# Patient Record
Sex: MALE | Race: WHITE | NOT HISPANIC OR LATINO | Employment: FULL TIME | URBAN - METROPOLITAN AREA
[De-identification: names, ages, dates, MRNs, and addresses within clinical notes are randomized per-mention and may not be internally consistent; named-entity substitution may affect disease eponyms.]

---

## 2021-01-15 ENCOUNTER — OFFICE VISIT (OUTPATIENT)
Dept: FAMILY MEDICINE CLINIC | Facility: CLINIC | Age: 31
End: 2021-01-15

## 2021-01-15 VITALS
SYSTOLIC BLOOD PRESSURE: 120 MMHG | RESPIRATION RATE: 16 BRPM | DIASTOLIC BLOOD PRESSURE: 70 MMHG | BODY MASS INDEX: 42.66 KG/M2 | HEIGHT: 72 IN | HEART RATE: 86 BPM | WEIGHT: 315 LBS | TEMPERATURE: 97.3 F | OXYGEN SATURATION: 98 %

## 2021-01-15 DIAGNOSIS — Z00.00 WELLNESS EXAMINATION: Primary | ICD-10-CM

## 2021-01-15 PROBLEM — J45.909 CHILDHOOD ASTHMA WITHOUT COMPLICATION: Status: ACTIVE | Noted: 2021-01-15

## 2021-01-15 PROCEDURE — 99385 PREV VISIT NEW AGE 18-39: CPT | Performed by: NURSE PRACTITIONER

## 2021-01-15 NOTE — PROGRESS NOTES
Subjective   Greg Fletcher is a 30 y.o. male.     History of Present Illness   Greg Fletcher 30 y.o. male who presents for a new patient to get established and for a wellness visit.  he has a history of   Patient Active Problem List   Diagnosis   • Childhood asthma without complication   .  he has been feeling well.  Labs results discussed in detail with the patient.  Plan to update vaccines if needed today.  I  reviewed health maintenance with him as part of my preventative care plan.      States he has not needed an inhaler since he was a child.  He will occasionally get bronchitis but otherwise does not have any issues with wheezing.   Health Habits:  Dental Exam. unknown  Eye Exam. up to date  Exercise: 3 times/week.  Current exercise activities include: cardiovascular workout on exercise equipment and weightlifting  Preventative counseling provided and diet/exercise and vaccinations.     The following portions of the patient's history were reviewed and updated as appropriate: allergies, current medications, past family history, past medical history, past social history, past surgical history and problem list.    Review of Systems   Constitutional: Negative for fatigue.   HENT: Negative for congestion.    Eyes: Negative for visual disturbance.   Respiratory: Negative for cough and shortness of breath.    Cardiovascular: Negative for chest pain and palpitations.   Gastrointestinal: Negative for constipation and diarrhea.   Endocrine: Negative for cold intolerance, heat intolerance, polydipsia, polyphagia and polyuria.   Genitourinary: Negative for difficulty urinating.   Musculoskeletal: Negative for back pain.   Skin: Negative for rash.   Allergic/Immunologic: Negative for immunocompromised state.   Neurological: Negative for dizziness and headaches.   Hematological: Negative for adenopathy. Does not bruise/bleed easily.   Psychiatric/Behavioral: Negative for dysphoric mood and sleep  disturbance. The patient is not nervous/anxious.        Objective   Physical Exam  Vitals signs and nursing note reviewed.   Constitutional:       Appearance: Normal appearance. He is well-developed.   Cardiovascular:      Rate and Rhythm: Normal rate and regular rhythm.   Pulmonary:      Effort: Pulmonary effort is normal.      Breath sounds: Normal breath sounds.   Skin:     General: Skin is warm and dry.   Neurological:      Mental Status: He is alert and oriented to person, place, and time.   Psychiatric:         Mood and Affect: Mood normal.         Behavior: Behavior normal.         Thought Content: Thought content normal.         Judgment: Judgment normal.         Assessment/Plan   Diagnoses and all orders for this visit:    1. Wellness examination (Primary)  -     Tdap Vaccine Greater Than or Equal To 6yo IM  -     Comprehensive metabolic panel  -     Lipid panel  -     CBC and Differential  -     TSH

## 2021-01-18 PROCEDURE — 90715 TDAP VACCINE 7 YRS/> IM: CPT | Performed by: NURSE PRACTITIONER

## 2021-01-18 PROCEDURE — 90471 IMMUNIZATION ADMIN: CPT | Performed by: NURSE PRACTITIONER

## 2021-01-20 LAB
ALBUMIN SERPL-MCNC: 4.3 G/DL (ref 3.5–5.2)
ALBUMIN/GLOB SERPL: 1.5 G/DL
ALP SERPL-CCNC: 56 U/L (ref 39–117)
ALT SERPL-CCNC: 44 U/L (ref 1–41)
AST SERPL-CCNC: 20 U/L (ref 1–40)
BASOPHILS # BLD AUTO: 0.05 10*3/MM3 (ref 0–0.2)
BASOPHILS NFR BLD AUTO: 0.6 % (ref 0–1.5)
BILIRUB SERPL-MCNC: 0.5 MG/DL (ref 0–1.2)
BUN SERPL-MCNC: 14 MG/DL (ref 6–20)
BUN/CREAT SERPL: 14.4 (ref 7–25)
CALCIUM SERPL-MCNC: 9.3 MG/DL (ref 8.6–10.5)
CHLORIDE SERPL-SCNC: 99 MMOL/L (ref 98–107)
CHOLEST SERPL-MCNC: 189 MG/DL (ref 0–200)
CO2 SERPL-SCNC: 29.1 MMOL/L (ref 22–29)
CREAT SERPL-MCNC: 0.97 MG/DL (ref 0.76–1.27)
EOSINOPHIL # BLD AUTO: 0.29 10*3/MM3 (ref 0–0.4)
EOSINOPHIL NFR BLD AUTO: 3.2 % (ref 0.3–6.2)
ERYTHROCYTE [DISTWIDTH] IN BLOOD BY AUTOMATED COUNT: 12.8 % (ref 12.3–15.4)
GLOBULIN SER CALC-MCNC: 2.8 GM/DL
GLUCOSE SERPL-MCNC: 120 MG/DL (ref 65–99)
HCT VFR BLD AUTO: 47.5 % (ref 37.5–51)
HDLC SERPL-MCNC: 31 MG/DL (ref 40–60)
HGB BLD-MCNC: 15.7 G/DL (ref 13–17.7)
IMM GRANULOCYTES # BLD AUTO: 0.02 10*3/MM3 (ref 0–0.05)
IMM GRANULOCYTES NFR BLD AUTO: 0.2 % (ref 0–0.5)
LDLC SERPL CALC-MCNC: 130 MG/DL (ref 0–100)
LYMPHOCYTES # BLD AUTO: 2.96 10*3/MM3 (ref 0.7–3.1)
LYMPHOCYTES NFR BLD AUTO: 32.6 % (ref 19.6–45.3)
MCH RBC QN AUTO: 28.1 PG (ref 26.6–33)
MCHC RBC AUTO-ENTMCNC: 33.1 G/DL (ref 31.5–35.7)
MCV RBC AUTO: 85.1 FL (ref 79–97)
MONOCYTES # BLD AUTO: 0.57 10*3/MM3 (ref 0.1–0.9)
MONOCYTES NFR BLD AUTO: 6.3 % (ref 5–12)
NEUTROPHILS # BLD AUTO: 5.19 10*3/MM3 (ref 1.7–7)
NEUTROPHILS NFR BLD AUTO: 57.1 % (ref 42.7–76)
NRBC BLD AUTO-RTO: 0 /100 WBC (ref 0–0.2)
PLATELET # BLD AUTO: 312 10*3/MM3 (ref 140–450)
POTASSIUM SERPL-SCNC: 4.4 MMOL/L (ref 3.5–5.2)
PROT SERPL-MCNC: 7.1 G/DL (ref 6–8.5)
RBC # BLD AUTO: 5.58 10*6/MM3 (ref 4.14–5.8)
SODIUM SERPL-SCNC: 137 MMOL/L (ref 136–145)
TRIGL SERPL-MCNC: 155 MG/DL (ref 0–150)
TSH SERPL DL<=0.005 MIU/L-ACNC: 2.46 UIU/ML (ref 0.27–4.2)
VLDLC SERPL CALC-MCNC: 28 MG/DL (ref 5–40)
WBC # BLD AUTO: 9.08 10*3/MM3 (ref 3.4–10.8)

## 2021-01-22 LAB
HBA1C MFR BLD: 5.4 % (ref 4.8–5.6)
Lab: NORMAL
WRITTEN AUTHORIZATION: NORMAL

## 2021-04-16 ENCOUNTER — BULK ORDERING (OUTPATIENT)
Dept: CASE MANAGEMENT | Facility: OTHER | Age: 31
End: 2021-04-16

## 2021-04-16 DIAGNOSIS — Z23 IMMUNIZATION DUE: ICD-10-CM

## 2021-04-19 ENCOUNTER — IMMUNIZATION (OUTPATIENT)
Dept: VACCINE CLINIC | Facility: HOSPITAL | Age: 31
End: 2021-04-19

## 2021-04-19 PROCEDURE — 0001A: CPT | Performed by: INTERNAL MEDICINE

## 2021-04-19 PROCEDURE — 91300 HC SARSCOV02 VAC 30MCG/0.3ML IM: CPT | Performed by: INTERNAL MEDICINE

## 2021-05-10 ENCOUNTER — IMMUNIZATION (OUTPATIENT)
Dept: VACCINE CLINIC | Facility: HOSPITAL | Age: 31
End: 2021-05-10

## 2021-05-10 PROCEDURE — 91300 HC SARSCOV02 VAC 30MCG/0.3ML IM: CPT | Performed by: INTERNAL MEDICINE

## 2021-05-10 PROCEDURE — 0002A: CPT | Performed by: INTERNAL MEDICINE

## 2025-03-18 ENCOUNTER — APPOINTMENT (OUTPATIENT)
Dept: CT IMAGING | Facility: HOSPITAL | Age: 35
End: 2025-03-18
Payer: COMMERCIAL

## 2025-03-18 ENCOUNTER — HOSPITAL ENCOUNTER (EMERGENCY)
Facility: HOSPITAL | Age: 35
Discharge: HOME | End: 2025-03-18
Attending: EMERGENCY MEDICINE | Admitting: EMERGENCY MEDICINE
Payer: COMMERCIAL

## 2025-03-18 ENCOUNTER — APPOINTMENT (OUTPATIENT)
Dept: GENERAL RADIOLOGY | Facility: HOSPITAL | Age: 35
End: 2025-03-18
Payer: COMMERCIAL

## 2025-03-18 VITALS
HEART RATE: 118 BPM | OXYGEN SATURATION: 100 % | HEIGHT: 72 IN | SYSTOLIC BLOOD PRESSURE: 170 MMHG | DIASTOLIC BLOOD PRESSURE: 94 MMHG | RESPIRATION RATE: 18 BRPM | TEMPERATURE: 98.5 F | WEIGHT: 270 LBS | BODY MASS INDEX: 36.57 KG/M2

## 2025-03-18 DIAGNOSIS — K57.32 SIGMOID DIVERTICULITIS: Primary | ICD-10-CM

## 2025-03-18 LAB
ALBUMIN SERPL-MCNC: 4 G/DL (ref 3.5–5.2)
ALBUMIN/GLOB SERPL: 1.1 G/DL
ALP SERPL-CCNC: 83 U/L (ref 39–117)
ALT SERPL W P-5'-P-CCNC: 22 U/L (ref 1–41)
ANION GAP SERPL CALCULATED.3IONS-SCNC: 10.9 MMOL/L (ref 5–15)
AST SERPL-CCNC: 13 U/L (ref 1–40)
BASOPHILS # BLD AUTO: 0.02 10*3/MM3 (ref 0–0.2)
BASOPHILS NFR BLD AUTO: 0.2 % (ref 0–1.5)
BILIRUB SERPL-MCNC: 0.7 MG/DL (ref 0–1.2)
BUN SERPL-MCNC: 12 MG/DL (ref 6–20)
BUN/CREAT SERPL: 12.1 (ref 7–25)
CALCIUM SPEC-SCNC: 9.2 MG/DL (ref 8.6–10.5)
CHLORIDE SERPL-SCNC: 97 MMOL/L (ref 98–107)
CO2 SERPL-SCNC: 24.1 MMOL/L (ref 22–29)
CREAT SERPL-MCNC: 0.99 MG/DL (ref 0.76–1.27)
DEPRECATED RDW RBC AUTO: 35.1 FL (ref 37–54)
EGFRCR SERPLBLD CKD-EPI 2021: 101.9 ML/MIN/1.73
EOSINOPHIL # BLD AUTO: 0.15 10*3/MM3 (ref 0–0.4)
EOSINOPHIL NFR BLD AUTO: 1.2 % (ref 0.3–6.2)
ERYTHROCYTE [DISTWIDTH] IN BLOOD BY AUTOMATED COUNT: 11.8 % (ref 12.3–15.4)
GLOBULIN UR ELPH-MCNC: 3.6 GM/DL
GLUCOSE SERPL-MCNC: 309 MG/DL (ref 65–99)
HCT VFR BLD AUTO: 43.3 % (ref 37.5–51)
HGB BLD-MCNC: 15.2 G/DL (ref 13–17.7)
IMM GRANULOCYTES # BLD AUTO: 0.02 10*3/MM3 (ref 0–0.05)
IMM GRANULOCYTES NFR BLD AUTO: 0.2 % (ref 0–0.5)
LYMPHOCYTES # BLD AUTO: 2.97 10*3/MM3 (ref 0.7–3.1)
LYMPHOCYTES NFR BLD AUTO: 23.9 % (ref 19.6–45.3)
MCH RBC QN AUTO: 28.4 PG (ref 26.6–33)
MCHC RBC AUTO-ENTMCNC: 35.1 G/DL (ref 31.5–35.7)
MCV RBC AUTO: 80.9 FL (ref 79–97)
MONOCYTES # BLD AUTO: 0.7 10*3/MM3 (ref 0.1–0.9)
MONOCYTES NFR BLD AUTO: 5.6 % (ref 5–12)
NEUTROPHILS NFR BLD AUTO: 68.9 % (ref 42.7–76)
NEUTROPHILS NFR BLD AUTO: 8.59 10*3/MM3 (ref 1.7–7)
PLATELET # BLD AUTO: 263 10*3/MM3 (ref 140–450)
PMV BLD AUTO: 8.2 FL (ref 6–12)
POTASSIUM SERPL-SCNC: 3.4 MMOL/L (ref 3.5–5.2)
PROT SERPL-MCNC: 7.6 G/DL (ref 6–8.5)
RBC # BLD AUTO: 5.35 10*6/MM3 (ref 4.14–5.8)
SODIUM SERPL-SCNC: 132 MMOL/L (ref 136–145)
WBC NRBC COR # BLD AUTO: 12.45 10*3/MM3 (ref 3.4–10.8)

## 2025-03-18 PROCEDURE — 99284 EMERGENCY DEPT VISIT MOD MDM: CPT | Performed by: EMERGENCY MEDICINE

## 2025-03-18 PROCEDURE — 85025 COMPLETE CBC W/AUTO DIFF WBC: CPT | Performed by: EMERGENCY MEDICINE

## 2025-03-18 PROCEDURE — 25510000001 IOPAMIDOL PER 1 ML: Performed by: EMERGENCY MEDICINE

## 2025-03-18 PROCEDURE — 99285 EMERGENCY DEPT VISIT HI MDM: CPT

## 2025-03-18 PROCEDURE — 74177 CT ABD & PELVIS W/CONTRAST: CPT

## 2025-03-18 PROCEDURE — 80053 COMPREHEN METABOLIC PANEL: CPT | Performed by: EMERGENCY MEDICINE

## 2025-03-18 RX ORDER — IOPAMIDOL 755 MG/ML
100 INJECTION, SOLUTION INTRAVASCULAR
Status: COMPLETED | OUTPATIENT
Start: 2025-03-18 | End: 2025-03-18

## 2025-03-18 RX ADMIN — AMOXICILLIN AND CLAVULANATE POTASSIUM 1 TABLET: 875; 125 TABLET, FILM COATED ORAL at 23:02

## 2025-03-18 RX ADMIN — IOPAMIDOL 85 ML: 755 INJECTION, SOLUTION INTRAVENOUS at 21:58

## 2025-03-18 NOTE — Clinical Note
[facility] [address]  [address]  [facility] [address]    Greg Fletcher was seen and treated in our emergency department on 3/18/2025.  He may return to work on 03/20/2025.         Thank you for choosing [facility].    Elmer Peter MD

## 2025-03-19 NOTE — DISCHARGE INSTRUCTIONS
Take probiotic or 1 cultured yogurt twice a day while on Augmentin.  Call and follow-up with your primary in 1 week to make sure that you are diverticulitis is resolving.  Return to the Emergency Department if fever greater than 100.4.  Take Tylenol 650 mg every 4 hours for any discomfort.

## 2025-03-19 NOTE — FSED PROVIDER NOTE
Subjective   History of Present Illness  Patient is complaining of abdominal pain for the last 2 weeks.  Patient feels a little bit bloated no vomiting tiny bit of diarrhea sometimes nausea.  The patient maybe has been eating more frequently out and not home-cooked.  The patient's denying any fever denies any urinary symptoms.      Review of Systems   Gastrointestinal:  Positive for abdominal pain, diarrhea and nausea.   All other systems reviewed and are negative.      History reviewed. No pertinent past medical history.    No Known Allergies    History reviewed. No pertinent surgical history.    History reviewed. No pertinent family history.    Social History     Socioeconomic History    Marital status:    Tobacco Use    Smoking status: Never   Substance and Sexual Activity    Alcohol use: Yes           Objective   Physical Exam  Vitals and nursing note reviewed.   Constitutional:       General: He is not in acute distress.     Appearance: He is well-developed. He is not ill-appearing, toxic-appearing or diaphoretic.   HENT:      Head: Normocephalic and atraumatic.   Eyes:      Extraocular Movements: Extraocular movements intact.      Pupils: Pupils are equal, round, and reactive to light.   Cardiovascular:      Rate and Rhythm: Normal rate and regular rhythm.      Heart sounds: Normal heart sounds.   Pulmonary:      Effort: Pulmonary effort is normal. No respiratory distress.      Breath sounds: Normal breath sounds. No stridor. No wheezing or rhonchi.   Abdominal:      General: Abdomen is protuberant. Bowel sounds are normal.      Palpations: Abdomen is soft.      Tenderness: There is generalized no abdominal tenderness.   Skin:     General: Skin is warm and dry.      Capillary Refill: Capillary refill takes less than 2 seconds.   Neurological:      General: No focal deficit present.      Mental Status: He is alert and oriented to person, place, and time.   Psychiatric:         Mood and Affect: Mood  normal.         Procedures           ED Course                                           Medical Decision Making  Laboratories demonstrate a white count of 12.45 hemoglobin is 15.2 there is an infection he is not toxic appearing he does not look ill his glucose is 309 and elevated his potassium slightly low at 3.4 slightly low sodium chloride 132 and 97 respectively. Patient has sigmoid diverticulitis without extramural gas.  The patient has no abscess or perforation there is an ill-defined low attenuation that needs to be rechecked.    Patient will be started on Augmentin follow-up with his primary to get rechecked for clearance.    Amount and/or Complexity of Data Reviewed  Labs: ordered.     Details: Laboratories demonstrate a white count of 12.45 hemoglobin is 15.2 there is an infection he is not toxic appearing he does not look ill his glucose is 309 and elevated his potassium slightly low at 3.4 slightly low sodium chloride 132 and 97 respectively.  Radiology: ordered.     Details: Patient has sigmoid diverticulitis without extramural gas.  The patient has no abscess or perforation there is an ill-defined low attenuation that needs to be rechecked.    Risk  Prescription drug management.        Final diagnoses:   Sigmoid diverticulitis       ED Disposition  ED Disposition       ED Disposition   Discharge    Condition   Stable    Comment   --               Charis Christensen ([place]), APRN  [address] [facility] [address]  [place] [id number] [phone]    In 1 week           Medication List        New Prescriptions      amoxicillin-clavulanate 875-125 MG per tablet  Commonly known as: AUGMENTIN  Take 1 tablet by mouth 2 (Two) Times a Day.               Where to Get Your Medications        These medications were sent to [address] [address]      Hours: Monday to Friday 7 AM to 6 PM, Saturday & Sunday 8 AM to 4:30 PM (Closed 12 PM to 12:30 PM) Phone:  [phone]   amoxicillin-clavulanate 875-125 MG per tablet

## 2025-03-25 ENCOUNTER — OFFICE VISIT (OUTPATIENT)
Dept: FAMILY MEDICINE CLINIC | Facility: CLINIC | Age: 35
End: 2025-03-25
Payer: COMMERCIAL

## 2025-03-25 VITALS
RESPIRATION RATE: 16 BRPM | HEART RATE: 110 BPM | OXYGEN SATURATION: 97 % | HEIGHT: 72 IN | DIASTOLIC BLOOD PRESSURE: 78 MMHG | WEIGHT: 268.6 LBS | BODY MASS INDEX: 36.38 KG/M2 | SYSTOLIC BLOOD PRESSURE: 136 MMHG | TEMPERATURE: 98.3 F

## 2025-03-25 DIAGNOSIS — K76.0 STEATOSIS OF LIVER: ICD-10-CM

## 2025-03-25 DIAGNOSIS — R16.0 HEPATOMEGALY: ICD-10-CM

## 2025-03-25 DIAGNOSIS — E78.2 MIXED HYPERLIPIDEMIA: ICD-10-CM

## 2025-03-25 DIAGNOSIS — R73.01 ELEVATED FASTING GLUCOSE: Primary | ICD-10-CM

## 2025-03-25 DIAGNOSIS — R93.3 ABNORMAL CT SCAN, COLON: ICD-10-CM

## 2025-03-25 DIAGNOSIS — Z12.5 SCREENING FOR PROSTATE CANCER: ICD-10-CM

## 2025-03-25 DIAGNOSIS — Z80.0 FAMILY HISTORY OF COLON CANCER: ICD-10-CM

## 2025-03-25 PROCEDURE — 99214 OFFICE O/P EST MOD 30 MIN: CPT | Performed by: NURSE PRACTITIONER

## 2025-03-25 NOTE — PROGRESS NOTES
Subjective   Greg Fletcher is a 35 y.o. male.     Abdominal Pain  Associated symptoms include constipation, diarrhea and dysuria. Pertinent negatives include no arthralgias, fever, frequency, hematuria, myalgias, nausea or vomiting.      Greg Fletcher 35 y.o. male presents today for Emergency Room follow up.  he was treated Petra Garcia for diverticulitis  .  I reviewed all of the labs and diagnostic testing.  The patient's medications were not changed:  Current outpatient and discharge medications have been reconciled for the patient.  Reviewed by: NILES Ling ([place])    he does not have a follow up appointment with a specialist:     Hospital note as follows:  History of Present Illness  Patient is complaining of abdominal pain for the last 2 weeks.  Patient feels a little bit bloated no vomiting tiny bit of diarrhea sometimes nausea.  The patient maybe has been eating more frequently out and not home-cooked.  The patient's denying any fever denies any urinary symptoms.        Review of Systems   Gastrointestinal:  Positive for abdominal pain, diarrhea and nausea.   All other systems reviewed and are negative.        Medical History   History reviewed. No pertinent past medical history.        Allergies   No Known Allergies        Surgical History   History reviewed. No pertinent surgical history.        History reviewed. No pertinent family history.     Social History   Social History           Socioeconomic History    Marital status:    Tobacco Use    Smoking status: Never   Substance and Sexual Activity    Alcohol use: Yes                  Objective  Physical Exam  Vitals and nursing note reviewed.   Constitutional:       General: He is not in acute distress.     Appearance: He is well-developed. He is not ill-appearing, toxic-appearing or diaphoretic.   HENT:      Head: Normocephalic and atraumatic.   Eyes:      Extraocular Movements: Extraocular movements intact.       Pupils: Pupils are equal, round, and reactive to light.   Cardiovascular:      Rate and Rhythm: Normal rate and regular rhythm.      Heart sounds: Normal heart sounds.   Pulmonary:      Effort: Pulmonary effort is normal. No respiratory distress.      Breath sounds: Normal breath sounds. No stridor. No wheezing or rhonchi.   Abdominal:      General: Abdomen is protuberant. Bowel sounds are normal.      Palpations: Abdomen is soft.      Tenderness: There is generalized no abdominal tenderness.   Skin:     General: Skin is warm and dry.      Capillary Refill: Capillary refill takes less than 2 seconds.   Neurological:      General: No focal deficit present.      Mental Status: He is alert and oriented to person, place, and time.   Psychiatric:         Mood and Affect: Mood normal.            Procedures              ED Course                                        Medical Decision Making  Laboratories demonstrate a white count of 12.45 hemoglobin is 15.2 there is an infection he is not toxic appearing he does not look ill his glucose is 309 and elevated his potassium slightly low at 3.4 slightly low sodium chloride 132 and 97 respectively. Patient has sigmoid diverticulitis without extramural gas.  The patient has no abscess or perforation there is an ill-defined low attenuation that needs to be rechecked.     Patient will be started on Augmentin follow-up with his primary to get rechecked for clearance.     Amount and/or Complexity of Data Reviewed  Labs: ordered.     Details: Laboratories demonstrate a white count of 12.45 hemoglobin is 15.2 there is an infection he is not toxic appearing he does not look ill his glucose is 309 and elevated his potassium slightly low at 3.4 slightly low sodium chloride 132 and 97 respectively.  Radiology: ordered.     Details: Patient has sigmoid diverticulitis without extramural gas.  The patient has no abscess or perforation there is an ill-defined low attenuation that needs to be  rechecked.     Risk  Prescription drug management.           Final diagnoses:   Sigmoid diverticulitis         ED Disposition  ED Disposition         ED Disposition   Discharge    Condition   Stable    Comment   --                   Charis Christensen ([place]), APRN  [address] [facility]  [address]  [place] [id number] [phone]     In 1 week               Medication List          New Prescriptions       amoxicillin-clavulanate 875-125 MG per tablet  Commonly known as: AUGMENTIN  Take 1 tablet by mouth 2 (Two) Times a Day.                    Where to Get Your Medications          These medications were sent to [address] [address]        Hours: Monday to Friday 7 AM to 6 PM, Saturday & Sunday 8 AM to 4:30 PM (Closed 12 PM to 12:30 PM) Phone: [phone]   amoxicillin-clavulanate 875-125 MG per tablet                       He was given Augmentin x 10 days and has been taking as prescribed.  He has 2 days left of medication.  He reports near resolution of abdominal pain and that his bowel movements appear to be returning to normal.  He also had area of ill-defined attenuation in the colon which needs further evaluation with CT or colonoscopy.  He reports his grandfather had colon cancer in his father's had polyps removed.  Patient also had hepatic steatosis and liver enlargement noted.      He also had a glucose level of over 300.  He does not have history of diabetes but did have an elevated glucose level when labs were done 4 years ago.  His A1c at that time was normal.      The following portions of the patient's history were reviewed and updated as appropriate: allergies, current medications, past family history, past medical history, past social history, past surgical history, and problem list.    Review of Systems   Constitutional:  Negative for fever.   Gastrointestinal:  Positive for abdominal pain, constipation and diarrhea. Negative for nausea and  vomiting.   Genitourinary:  Positive for dysuria. Negative for frequency and hematuria.   Musculoskeletal:  Negative for arthralgias and myalgias.       Objective   Physical Exam  Vitals and nursing note reviewed.   Constitutional:       Appearance: He is well-developed.   Cardiovascular:      Rate and Rhythm: Normal rate and regular rhythm.   Pulmonary:      Effort: Pulmonary effort is normal.      Breath sounds: Normal breath sounds.   Neurological:      Mental Status: He is oriented to person, place, and time.   Psychiatric:         Mood and Affect: Mood normal.         Behavior: Behavior normal.         Thought Content: Thought content normal.         Judgment: Judgment normal.         Assessment & Plan   Diagnoses and all orders for this visit:    1. Elevated fasting glucose (Primary)  -     Comprehensive metabolic panel  -     Lipid panel  -     CBC and Differential  -     TSH  -     Hemoglobin A1c  -     Microalbumin / Creatinine Urine Ratio - Urine, Clean Catch    2. Mixed hyperlipidemia  -     Comprehensive metabolic panel  -     Lipid panel  -     CBC and Differential  -     TSH  -     Hemoglobin A1c  -     Microalbumin / Creatinine Urine Ratio - Urine, Clean Catch    3. Screening for prostate cancer  -     PSA Screen    4. Abnormal CT scan, colon  -     Cancel: Ambulatory Referral For Screening Colonoscopy  -     Ambulatory Referral to Gastroenterology    5. Family history of colon cancer  -     Cancel: Ambulatory Referral For Screening Colonoscopy  -     Ambulatory Referral to Gastroenterology    6. Hepatomegaly  -     Ambulatory Referral to Gastroenterology    7. Steatosis of liver  -     Ambulatory Referral to Gastroenterology          Will order full lab panel that patient will get done 1 day when he is fasting.  Discussed diagnostic criteria for type 2 diabetes with him and goal to keep A1c below 7.  Depending on lab results, may have him make a follow-up appointment after to discuss.  Referral  placed to gastroenterology for hepatomegaly and screening colonoscopy.

## 2025-03-28 LAB
ALBUMIN SERPL-MCNC: 4.1 G/DL (ref 3.5–5.2)
ALBUMIN/CREAT UR: 20 MG/G CREAT (ref 0–29)
ALBUMIN/GLOB SERPL: 1.6 G/DL
ALP SERPL-CCNC: 65 U/L (ref 39–117)
ALT SERPL-CCNC: 48 U/L (ref 1–41)
AST SERPL-CCNC: 20 U/L (ref 1–40)
BASOPHILS # BLD AUTO: 0.05 10*3/MM3 (ref 0–0.2)
BASOPHILS NFR BLD AUTO: 0.7 % (ref 0–1.5)
BILIRUB SERPL-MCNC: 0.5 MG/DL (ref 0–1.2)
BUN SERPL-MCNC: 12 MG/DL (ref 6–20)
BUN/CREAT SERPL: 13.8 (ref 7–25)
CALCIUM SERPL-MCNC: 9.4 MG/DL (ref 8.6–10.5)
CHLORIDE SERPL-SCNC: 99 MMOL/L (ref 98–107)
CHOLEST SERPL-MCNC: 200 MG/DL (ref 0–200)
CO2 SERPL-SCNC: 28.1 MMOL/L (ref 22–29)
CREAT SERPL-MCNC: 0.87 MG/DL (ref 0.76–1.27)
CREAT UR-MCNC: 195.9 MG/DL
EGFRCR SERPLBLD CKD-EPI 2021: 115.4 ML/MIN/1.73
EOSINOPHIL # BLD AUTO: 0.3 10*3/MM3 (ref 0–0.4)
EOSINOPHIL NFR BLD AUTO: 4.3 % (ref 0.3–6.2)
ERYTHROCYTE [DISTWIDTH] IN BLOOD BY AUTOMATED COUNT: 12.6 % (ref 12.3–15.4)
GLOBULIN SER CALC-MCNC: 2.6 GM/DL
GLUCOSE SERPL-MCNC: 300 MG/DL (ref 65–99)
HBA1C MFR BLD: 11.4 % (ref 4.8–5.6)
HCT VFR BLD AUTO: 47.4 % (ref 37.5–51)
HDLC SERPL-MCNC: 29 MG/DL (ref 40–60)
HGB BLD-MCNC: 16.7 G/DL (ref 13–17.7)
IMM GRANULOCYTES # BLD AUTO: 0.01 10*3/MM3 (ref 0–0.05)
IMM GRANULOCYTES NFR BLD AUTO: 0.1 % (ref 0–0.5)
LDLC SERPL CALC-MCNC: 126 MG/DL (ref 0–100)
LYMPHOCYTES # BLD AUTO: 3.1 10*3/MM3 (ref 0.7–3.1)
LYMPHOCYTES NFR BLD AUTO: 44.7 % (ref 19.6–45.3)
MCH RBC QN AUTO: 29.6 PG (ref 26.6–33)
MCHC RBC AUTO-ENTMCNC: 35.2 G/DL (ref 31.5–35.7)
MCV RBC AUTO: 83.9 FL (ref 79–97)
MICROALBUMIN UR-MCNC: 39.3 UG/ML
MONOCYTES # BLD AUTO: 0.42 10*3/MM3 (ref 0.1–0.9)
MONOCYTES NFR BLD AUTO: 6.1 % (ref 5–12)
NEUTROPHILS # BLD AUTO: 3.06 10*3/MM3 (ref 1.7–7)
NEUTROPHILS NFR BLD AUTO: 44.1 % (ref 42.7–76)
NRBC BLD AUTO-RTO: 0 /100 WBC (ref 0–0.2)
PLATELET # BLD AUTO: 309 10*3/MM3 (ref 140–450)
POTASSIUM SERPL-SCNC: 4.4 MMOL/L (ref 3.5–5.2)
PROT SERPL-MCNC: 6.7 G/DL (ref 6–8.5)
PSA SERPL-MCNC: 1.25 NG/ML (ref 0–4)
RBC # BLD AUTO: 5.65 10*6/MM3 (ref 4.14–5.8)
SODIUM SERPL-SCNC: 137 MMOL/L (ref 136–145)
TRIGL SERPL-MCNC: 250 MG/DL (ref 0–150)
TSH SERPL DL<=0.005 MIU/L-ACNC: 2.26 UIU/ML (ref 0.27–4.2)
VLDLC SERPL CALC-MCNC: 45 MG/DL (ref 5–40)
WBC # BLD AUTO: 6.94 10*3/MM3 (ref 3.4–10.8)

## 2025-03-31 ENCOUNTER — TELEPHONE (OUTPATIENT)
Dept: FAMILY MEDICINE CLINIC | Facility: CLINIC | Age: 35
End: 2025-03-31

## 2025-03-31 NOTE — TELEPHONE ENCOUNTER
Caller: Greg Fletcher    Relationship: Self    Best call back number: [phone]    Caller requesting test results: SELF    What test was performed: BLOOD WORK     When was the test performed: 3/27/25    Where was the test performed: IN OFFICE     Additional notes: PLEASE CALL WITH RESULTS

## 2025-04-28 DIAGNOSIS — R73.09 HEMOGLOBIN A1C GREATER THAN 9.0%: ICD-10-CM

## 2025-04-28 DIAGNOSIS — E11.9 NEW ONSET TYPE 2 DIABETES MELLITUS: Primary | ICD-10-CM

## 2025-05-05 ENCOUNTER — OFFICE VISIT (OUTPATIENT)
Dept: FAMILY MEDICINE CLINIC | Facility: CLINIC | Age: 35
End: 2025-05-05
Payer: COMMERCIAL

## 2025-05-05 VITALS
WEIGHT: 267.5 LBS | TEMPERATURE: 97.5 F | OXYGEN SATURATION: 98 % | HEIGHT: 72 IN | BODY MASS INDEX: 36.23 KG/M2 | DIASTOLIC BLOOD PRESSURE: 90 MMHG | HEART RATE: 100 BPM | SYSTOLIC BLOOD PRESSURE: 132 MMHG

## 2025-05-05 DIAGNOSIS — E11.9 NEW ONSET TYPE 2 DIABETES MELLITUS: Primary | ICD-10-CM

## 2025-05-05 PROCEDURE — 99213 OFFICE O/P EST LOW 20 MIN: CPT | Performed by: NURSE PRACTITIONER

## 2025-05-05 RX ORDER — HYDROCHLOROTHIAZIDE 12.5 MG/1
CAPSULE ORAL
Qty: 6 EACH | Refills: 3 | Status: SHIPPED | OUTPATIENT
Start: 2025-05-05

## 2025-05-05 NOTE — PROGRESS NOTES
"Subjective   Greg Raulito Fletcher is a 35 y.o. male.     Diabetes  Associated symptoms:     no polydipsia and no polyuria    Hypoglycemia symptoms:     no confusion, no headaches, no speech difficulty and no tremors        Since the last visit, he has overall felt fairly well.  He has  new diagnosis of type 2 diabetes and his recent A1c was over 11.  He has been taking metformin as prescribed and reports feeling well on medication.  He denies side effects.  He has a glucometer at home and has checked his glucose several times but is not checking it routinely each day.  He has an appointment next week with endocrinology .  he has been compliant with current medications have reviewed them.  The patient denies medication side effects.  Will refill medications. /90   Pulse 100   Temp 97.5 °F (36.4 °C) (Temporal)   Ht 182.9 cm (72.01\")   Wt 121 kg (267 lb 8 oz)   SpO2 98%   BMI 36.27 kg/m² .        Results for orders placed or performed in visit on 03/25/25   Comprehensive metabolic panel    Collection Time: 03/27/25  9:56 AM    Specimen: Blood   Result Value Ref Range    Glucose 300 (H) 65 - 99 mg/dL    BUN 12 6 - 20 mg/dL    Creatinine 0.87 0.76 - 1.27 mg/dL    EGFR Result 115.4 >60.0 mL/min/1.73    BUN/Creatinine Ratio 13.8 7.0 - 25.0    Sodium 137 136 - 145 mmol/L    Potassium 4.4 3.5 - 5.2 mmol/L    Chloride 99 98 - 107 mmol/L    Total CO2 28.1 22.0 - 29.0 mmol/L    Calcium 9.4 8.6 - 10.5 mg/dL    Total Protein 6.7 6.0 - 8.5 g/dL    Albumin 4.1 3.5 - 5.2 g/dL    Globulin 2.6 gm/dL    A/G Ratio 1.6 g/dL    Total Bilirubin 0.5 0.0 - 1.2 mg/dL    Alkaline Phosphatase 65 39 - 117 U/L    AST (SGOT) 20 1 - 40 U/L    ALT (SGPT) 48 (H) 1 - 41 U/L   Lipid panel    Collection Time: 03/27/25  9:56 AM    Specimen: Blood   Result Value Ref Range    Total Cholesterol 200 0 - 200 mg/dL    Triglycerides 250 (H) 0 - 150 mg/dL    HDL Cholesterol 29 (L) 40 - 60 mg/dL    VLDL Cholesterol Cb 45 (H) 5 - 40 mg/dL    LDL " Chol Calc (NIH) 126 (H) 0 - 100 mg/dL   TSH    Collection Time: 03/27/25  9:56 AM    Specimen: Blood   Result Value Ref Range    TSH 2.260 0.270 - 4.200 uIU/mL   Hemoglobin A1c    Collection Time: 03/27/25  9:56 AM    Specimen: Blood   Result Value Ref Range    Hemoglobin A1C 11.40 (H) 4.80 - 5.60 %   Microalbumin / Creatinine Urine Ratio - Urine, Clean Catch    Collection Time: 03/27/25  9:56 AM    Specimen: Urine, Clean Catch   Result Value Ref Range    Creatinine, Urine 195.9 Not Estab. mg/dL    Microalbumin, Urine 39.3 Not Estab. ug/mL    Microalbumin/Creatinine Ratio 20 0 - 29 mg/g creat   PSA Screen    Collection Time: 03/27/25  9:56 AM    Specimen: Blood   Result Value Ref Range    PSA 1.250 0.000 - 4.000 ng/mL   CBC and Differential    Collection Time: 03/27/25  9:56 AM    Specimen: Blood   Result Value Ref Range    WBC 6.94 3.40 - 10.80 10*3/mm3    RBC 5.65 4.14 - 5.80 10*6/mm3    Hemoglobin 16.7 13.0 - 17.7 g/dL    Hematocrit 47.4 37.5 - 51.0 %    MCV 83.9 79.0 - 97.0 fL    MCH 29.6 26.6 - 33.0 pg    MCHC 35.2 31.5 - 35.7 g/dL    RDW 12.6 12.3 - 15.4 %    Platelets 309 140 - 450 10*3/mm3    Neutrophil Rel % 44.1 42.7 - 76.0 %    Lymphocyte Rel % 44.7 19.6 - 45.3 %    Monocyte Rel % 6.1 5.0 - 12.0 %    Eosinophil Rel % 4.3 0.3 - 6.2 %    Basophil Rel % 0.7 0.0 - 1.5 %    Neutrophils Absolute 3.06 1.70 - 7.00 10*3/mm3    Lymphocytes Absolute 3.10 0.70 - 3.10 10*3/mm3    Monocytes Absolute 0.42 0.10 - 0.90 10*3/mm3    Eosinophils Absolute 0.30 0.00 - 0.40 10*3/mm3    Basophils Absolute 0.05 0.00 - 0.20 10*3/mm3    Immature Granulocyte Rel % 0.1 0.0 - 0.5 %    Immature Grans Absolute 0.01 0.00 - 0.05 10*3/mm3    nRBC 0.0 0.0 - 0.2 /100 WBC     He does have glucometer at home but has not been checking regularly.  He checked fasting glucose several weeks ago and it was 138.  He has checked it 2 hours post-prandial several times and states it got down into 130s.      The following portions of the patient's  history were reviewed and updated as appropriate: allergies, current medications, past family history, past medical history, past social history, past surgical history, and problem list.    Review of Systems   Endocrine: Negative for polydipsia and polyuria.   Neurological:  Negative for tremors, speech difficulty and headaches.   Psychiatric/Behavioral:  Negative for confusion.        Objective   Physical Exam  Vitals and nursing note reviewed.   Constitutional:       Appearance: Normal appearance. He is well-developed.   Cardiovascular:      Rate and Rhythm: Normal rate and regular rhythm.   Pulmonary:      Effort: Pulmonary effort is normal.      Breath sounds: Normal breath sounds.   Neurological:      Mental Status: He is alert and oriented to person, place, and time.   Psychiatric:         Mood and Affect: Mood normal.         Behavior: Behavior normal.         Thought Content: Thought content normal.         Judgment: Judgment normal.         Assessment & Plan   Diagnoses and all orders for this visit:    1. New onset type 2 diabetes mellitus (Primary)  -     Continuous Glucose Sensor (FreeStyle Lui 3 Plus Sensor); Change sensor every 15 days  Dispense: 6 each; Refill: 3               Discussed adding another medication to his regimen such as GLP-1.  Patient opts to hold off on adding medication and see endocrinology next week.  He is working hard to improve his glucose levels and the readings he reports from home are significantly improved.  Gave him a sample of freestyle lui 3+ sensor with instructions for placement.  Will see if we can get this covered by his insurance but try to get him started with a sample in the meantime.  He will follow-up again in 3 months or sooner if needed.

## 2025-05-13 ENCOUNTER — OFFICE VISIT (OUTPATIENT)
Dept: ENDOCRINOLOGY | Age: 35
End: 2025-05-13
Payer: COMMERCIAL

## 2025-05-13 VITALS
SYSTOLIC BLOOD PRESSURE: 124 MMHG | HEART RATE: 84 BPM | HEIGHT: 72 IN | DIASTOLIC BLOOD PRESSURE: 76 MMHG | OXYGEN SATURATION: 98 % | BODY MASS INDEX: 36.27 KG/M2 | TEMPERATURE: 97.8 F | WEIGHT: 267.8 LBS

## 2025-05-13 DIAGNOSIS — E11.65 TYPE 2 DIABETES MELLITUS WITH HYPERGLYCEMIA, WITHOUT LONG-TERM CURRENT USE OF INSULIN: Primary | ICD-10-CM

## 2025-05-13 PROBLEM — E78.5 HYPERLIPIDEMIA: Status: ACTIVE | Noted: 2025-05-13

## 2025-05-13 NOTE — PROGRESS NOTES
"Chief Complaint  Chief Complaint   Patient presents with    Diabetes     Type 2: Pt lui is attached, is not up to date on eye exam, no hx of retinopathy or neuropathy.         Subjective        History of Present Illness    Greg Fletcher 35 y.o. presents for an evaluation for Type 2 Diabetes    Referring provider: NILES Ling    He was diagnosed with diabetes in 03/2025    Family history of diabetes: maternal grandmother    Pt is on the following medications for their diabetes: metformin  mg 2 tablets daily      Past medications include none      Personal history of pancreatitis: denies  Personal/family history of thyroid cancer: denies    History of DKA: no      Denies polydipsia, polyuria, diarrhea, constipation, chest pain, shortness of breath, vision changes or numbness and tingling in feet/hands.    Pt is recently diagnosed with diabetes therefore never had diabetic eye exam    Pt does not have nephropathy.  Patient is not currently taking ACE/ARB    Pt denies have neuropathy.    Pt denies a history of CAD or CVA.    Last A1C in 03/25 was 11.40    Last microalbumin in 03/25 was negative          Blood Sugars    Blood glucoses are checked via Lui.    Fasting blood glucoses: mid 100s    Pre-meal blood glucoses: low to mid 100s    Pt denies episodes of hypoglycemia.          Sensor Data    Time in range 98%  High 2%  Very high 0%  Low 0%  Very low 0%      Average Glucose - 127 mg/dL      Sensor Wear - 56 %                Objective   Vital Signs:   /76   Pulse 84   Temp 97.8 °F (36.6 °C) (Oral)   Ht 182.9 cm (72.01\")   Wt 121 kg (267 lb 12.8 oz)   SpO2 98%   BMI 36.31 kg/m²       Physical Exam   Physical Exam  Constitutional:       General: He is not in acute distress.     Appearance: Normal appearance. He is not diaphoretic.   HENT:      Head: Normocephalic and atraumatic.   Eyes:      General:         Right eye: No discharge.         Left eye: No discharge.   Skin:     " General: Skin is warm and dry.   Neurological:      Mental Status: He is alert.   Psychiatric:         Mood and Affect: Mood normal.         Behavior: Behavior normal.                    Results Review:   Hemoglobin A1C   Date Value Ref Range Status   03/27/2025 11.40 (H) 4.80 - 5.60 % Final     Comment:     Hemoglobin A1C Ranges:  Increased Risk for Diabetes  5.7% to 6.4%  Diabetes                     >= 6.5%  Diabetic Goal                < 7.0%       Triglycerides   Date Value Ref Range Status   03/27/2025 250 (H) 0 - 150 mg/dL Final     HDL Cholesterol   Date Value Ref Range Status   03/27/2025 29 (L) 40 - 60 mg/dL Final     LDL Chol Calc (NIH)   Date Value Ref Range Status   03/27/2025 126 (H) 0 - 100 mg/dL Final     VLDL Cholesterol Cb   Date Value Ref Range Status   03/27/2025 45 (H) 5 - 40 mg/dL Final         Assessment and Plan   Diagnoses and all orders for this visit:    1. Type 2 diabetes mellitus with hyperglycemia, without long-term current use of insulin (Primary)    Pt states that he recently was diagnosed with diabetes.  That he had went to ER/Urgent care for diverticulitis where his blood glucose was over 300.  They told him to follow up with his PCP.  PCP check labs and A1c was 11.4% with blood glucose of 300 mg/dL.  PCP started on metformin  Pt states that since his diagnosis he has made several dietary changes - eating low carb and cutting out sweets.  Overall pt's Lui download looks great - 98% time in range without hypoglycemia and GMI of 6.3%  Given where his blood sugars currently are there are no further recommendations/medication adjustments/additions that I would make at this time.  Pt has done a great job with changing his diet to help lower his blood sugars  Continue with metformin ER 500mg 2 tablets daily  Continue with dietary modification  Continue with Lui  Advised for diabetic eye exam within the first year  Pt has follow up appointment with PCP in August of this year at which  another A1c needs to be checked.      RTC as needed - pt wants to return to PCP        Follow Up    Patient was given instructions and counseling regarding her condition or for health maintenance advice. Please see specific information pulled into the AVS if appropriate.              Celena Mccoy, NILES  05/13/25

## 2025-05-19 DIAGNOSIS — E11.9 NEW ONSET TYPE 2 DIABETES MELLITUS: ICD-10-CM

## 2025-05-19 RX ORDER — METFORMIN HYDROCHLORIDE 500 MG/1
1000 TABLET, EXTENDED RELEASE ORAL
Qty: 180 TABLET | Refills: 1 | Status: SHIPPED | OUTPATIENT
Start: 2025-05-19

## 2025-06-05 ENCOUNTER — OFFICE VISIT (OUTPATIENT)
Dept: GASTROENTEROLOGY | Facility: CLINIC | Age: 35
End: 2025-06-05
Payer: COMMERCIAL

## 2025-06-05 ENCOUNTER — TELEPHONE (OUTPATIENT)
Dept: GASTROENTEROLOGY | Facility: CLINIC | Age: 35
End: 2025-06-05
Payer: COMMERCIAL

## 2025-06-05 VITALS
BODY MASS INDEX: 36.44 KG/M2 | SYSTOLIC BLOOD PRESSURE: 137 MMHG | DIASTOLIC BLOOD PRESSURE: 95 MMHG | WEIGHT: 269 LBS | HEART RATE: 91 BPM | TEMPERATURE: 97.6 F | HEIGHT: 72 IN

## 2025-06-05 DIAGNOSIS — R93.3 ABNORMAL CT SCAN, COLON: Primary | ICD-10-CM

## 2025-06-05 DIAGNOSIS — Z83.719 FAMILY HISTORY OF POLYPS IN THE COLON: ICD-10-CM

## 2025-06-05 DIAGNOSIS — K76.0 METABOLIC DYSFUNCTION-ASSOCIATED STEATOTIC LIVER DISEASE (MASLD): ICD-10-CM

## 2025-06-05 DIAGNOSIS — Z80.0 FAMILY HISTORY OF COLON CANCER: ICD-10-CM

## 2025-06-05 PROCEDURE — 99204 OFFICE O/P NEW MOD 45 MIN: CPT | Performed by: PHYSICIAN ASSISTANT

## 2025-06-05 RX ORDER — FLUTICASONE PROPIONATE 50 MCG
2 SPRAY, SUSPENSION (ML) NASAL DAILY
COMMUNITY

## 2025-06-05 RX ORDER — LORATADINE 10 MG/1
10 TABLET ORAL DAILY
COMMUNITY

## 2025-06-05 NOTE — TELEPHONE ENCOUNTER
KEY  for COLONOSCOPY on  08/26 arrive at   11AM . mailed prep instructions to verified address on file....[employer] OK FOR THE [facility]UB TO RELAY

## 2025-06-05 NOTE — PROGRESS NOTES
"Chief Complaint  Abnormal CT    Subjective          History Of Present Illness:    Greg Fletcher is a  35 y.o. male patient with a new diagnosis of type 2 diabetes mellitus, hyperlipidemia who presents as a new patient for evaluation of diverticulitis, abnormal CT of the colon, steatosis/hepatomegaly.    Patient was seen in the emergency room on 3/18/2025 for abdominal pain.  He underwent CT pelvis 3/18/2025 which I personally reviewed with evidence of sigmoid diverticulitis ill-defined low-attenuation within the anterior wall of the sigmoid colon which may reflect intramural phlegmon.  Liver also appeared steatotic and enlarged.  Patient was prescribed Augmentin and discharged home.    Labs reviewed with glucose 309, white blood cell count 12.45, hemoglobin 15.2.  Normal liver function test. A1c 11.4. Current BMI 36.47.    Patient reports occasional alcohol use 1-2 drinks about once a week. Patient reports his mother has fatty liver disease. Patient is not on any supplements.     Denies any abdominal pain, jaundice, pruritus, diarrhea, constipation, melena, hematochezia.  No abnormal weight loss or poor appetite.  He has been eating sugar-free and has already significantly improved his glucose.  Planning for repeat A1c in August    Patient reports a history of colon cancer in his paternal grandfather whom just passed at 83 due to colon cancer and his father has a history of colon polyps.    Objective   Vital Signs:   /95 (BP Location: Right arm, Patient Position: Sitting, Cuff Size: Large Adult)   Pulse 91   Temp 97.6 °F (36.4 °C) (Temporal)   Ht 182.9 cm (72.01\")   Wt 122 kg (269 lb)   BMI 36.47 kg/m²       Physical Exam  Vitals reviewed.   Constitutional:       General: He is not in acute distress.     Appearance: Normal appearance. He is not ill-appearing.   HENT:      Head: Normocephalic and atraumatic.      Nose: Nose normal.      Mouth/Throat:      Pharynx: Oropharynx is clear.   Eyes:     "  Conjunctiva/sclera: Conjunctivae normal.   Pulmonary:      Effort: Pulmonary effort is normal.   Abdominal:      General: There is no distension.      Palpations: Abdomen is soft. There is no mass.      Tenderness: There is no abdominal tenderness.   Musculoskeletal:         General: No swelling. Normal range of motion.      Cervical back: Normal range of motion.   Skin:     General: Skin is warm and dry.      Findings: No bruising or rash.   Neurological:      General: No focal deficit present.      Mental Status: He is alert and oriented to person, place, and time.      Motor: No weakness.      Gait: Gait normal.   Psychiatric:         Mood and Affect: Mood normal.          Result Review :   The following data was reviewed by: Sindhu Tenorio PA-C on 06/05/2025:  CMP          3/18/2025    21:21 3/27/2025    09:56   CMP   Glucose 309  300    BUN 12  12    Creatinine 0.99  0.87    EGFR 101.9  115.4    Sodium 132  137    Potassium 3.4  4.4    Chloride 97  99    Calcium 9.2  9.4    Total Protein 7.6  6.7    Albumin 4.0  4.1    Globulin 3.6  2.6    Total Bilirubin 0.7  0.5    Alkaline Phosphatase 83  65    AST (SGOT) 13  20    ALT (SGPT) 22  48    Albumin/Globulin Ratio 1.1  1.6    BUN/Creatinine Ratio 12.1  13.8    Anion Gap 10.9       CBC          3/18/2025    21:21 3/27/2025    09:56   CBC   WBC 12.45  6.94    RBC 5.35  5.65    Hemoglobin 15.2  16.7    Hematocrit 43.3  47.4    MCV 80.9  83.9    MCH 28.4  29.6    MCHC 35.1  35.2    RDW 11.8  12.6    Platelets 263  309            Assessment and Plan    Diagnoses and all orders for this visit:    1. Abnormal CT scan, colon (Primary)  -     Case Request; Standing  -     Implement Anesthesia orders day of procedure.; Standing  -     Follow Anesthesia Guidelines / Protocol; Future  -     Verify bowel prep was successful; Standing  -     Give tap water enema if bowel prep was insufficient; Standing  -     Obtain Informed Consent; Standing  -     Case Request    2.  Family history of polyps in the colon  -     Case Request; Standing  -     Implement Anesthesia orders day of procedure.; Standing  -     Follow Anesthesia Guidelines / Protocol; Future  -     Verify bowel prep was successful; Standing  -     Give tap water enema if bowel prep was insufficient; Standing  -     Obtain Informed Consent; Standing  -     Case Request    3. Family history of colon cancer  -     Case Request; Standing  -     Implement Anesthesia orders day of procedure.; Standing  -     Follow Anesthesia Guidelines / Protocol; Future  -     Verify bowel prep was successful; Standing  -     Give tap water enema if bowel prep was insufficient; Standing  -     Obtain Informed Consent; Standing  -     Case Request    4. Metabolic dysfunction-associated steatotic liver disease (MASLD)       I recommend proceeding with colonoscopy to rule out diverticulosis, inflammatory bowel disease, microscopic colitis, ulceration, AVM, polyp, or mass/malignancy. Risks (including, but not limited to perforation, bleeding, sedation-related complications, and death), benefits, and alternatives to the procedure were discussed with the patient and all questions were answered. Patient is agreeable to plan of care.   Patient has findings on his imaging suggestive of fatty liver disease.  LFTs are currently normal at this time.  He has made significant dietary changes to manage his diabetes.  He will likely have significant improvement in his steatosis with these changes.  Would recommend ongoing weight loss and avoidance/minimal use of alcohol going forward.  Avoid hepatotoxic agents such as Tylenol.  If he starts any supplements, recommend discussing them with myself or his PCP first.  Recommend biannual CMP       Follow Up   Return for Dr. Brown, Colonoscopy.    Dragon dictation used throughout this note.            Sindhu Chance PA-C   [facility]  [facility] [facility] [address]  [place]  [address]  Office: [phone]

## 2025-08-05 ENCOUNTER — OFFICE VISIT (OUTPATIENT)
Dept: FAMILY MEDICINE CLINIC | Facility: CLINIC | Age: 35
End: 2025-08-05
Payer: COMMERCIAL

## 2025-08-05 VITALS
TEMPERATURE: 98.7 F | BODY MASS INDEX: 36.92 KG/M2 | DIASTOLIC BLOOD PRESSURE: 70 MMHG | HEIGHT: 72 IN | SYSTOLIC BLOOD PRESSURE: 110 MMHG | HEART RATE: 105 BPM | OXYGEN SATURATION: 97 % | WEIGHT: 272.6 LBS

## 2025-08-05 DIAGNOSIS — E11.9 NEW ONSET TYPE 2 DIABETES MELLITUS: Primary | ICD-10-CM

## 2025-08-05 PROCEDURE — 99213 OFFICE O/P EST LOW 20 MIN: CPT | Performed by: NURSE PRACTITIONER

## 2025-08-06 LAB
ALBUMIN SERPL-MCNC: 4.3 G/DL (ref 3.5–5.2)
ALBUMIN/GLOB SERPL: 1.4 G/DL
ALP SERPL-CCNC: 51 U/L (ref 39–117)
ALT SERPL-CCNC: 26 U/L (ref 1–41)
AST SERPL-CCNC: 17 U/L (ref 1–40)
BILIRUB SERPL-MCNC: 0.3 MG/DL (ref 0–1.2)
BUN SERPL-MCNC: 15 MG/DL (ref 6–20)
BUN/CREAT SERPL: 16.9 (ref 7–25)
CALCIUM SERPL-MCNC: 9.6 MG/DL (ref 8.6–10.5)
CHLORIDE SERPL-SCNC: 101 MMOL/L (ref 98–107)
CO2 SERPL-SCNC: 25.9 MMOL/L (ref 22–29)
CREAT SERPL-MCNC: 0.89 MG/DL (ref 0.76–1.27)
EGFRCR SERPLBLD CKD-EPI 2021: 114.6 ML/MIN/1.73
GLOBULIN SER CALC-MCNC: 3.1 GM/DL
GLUCOSE SERPL-MCNC: 123 MG/DL (ref 65–99)
HBA1C MFR BLD: 6.5 % (ref 4.8–5.6)
POTASSIUM SERPL-SCNC: 3.8 MMOL/L (ref 3.5–5.2)
PROT SERPL-MCNC: 7.4 G/DL (ref 6–8.5)
SODIUM SERPL-SCNC: 138 MMOL/L (ref 136–145)

## 2025-08-26 ENCOUNTER — HOSPITAL ENCOUNTER (OUTPATIENT)
Facility: HOSPITAL | Age: 35
Setting detail: HOSPITAL OUTPATIENT SURGERY
Discharge: HOME | End: 2025-08-26
Attending: INTERNAL MEDICINE | Admitting: INTERNAL MEDICINE
Payer: COMMERCIAL

## 2025-08-26 ENCOUNTER — ANESTHESIA EVENT (OUTPATIENT)
Dept: GASTROENTEROLOGY | Facility: HOSPITAL | Age: 35
End: 2025-08-26
Payer: COMMERCIAL

## 2025-08-26 ENCOUNTER — ANESTHESIA (OUTPATIENT)
Dept: GASTROENTEROLOGY | Facility: HOSPITAL | Age: 35
End: 2025-08-26
Payer: COMMERCIAL

## 2025-08-26 VITALS
WEIGHT: 268 LBS | HEART RATE: 85 BPM | OXYGEN SATURATION: 98 % | DIASTOLIC BLOOD PRESSURE: 83 MMHG | BODY MASS INDEX: 36.3 KG/M2 | SYSTOLIC BLOOD PRESSURE: 115 MMHG | HEIGHT: 72 IN | RESPIRATION RATE: 18 BRPM

## 2025-08-26 DIAGNOSIS — R93.3 ABNORMAL CT SCAN, COLON: ICD-10-CM

## 2025-08-26 DIAGNOSIS — Z83.719 FAMILY HISTORY OF POLYPS IN THE COLON: ICD-10-CM

## 2025-08-26 DIAGNOSIS — Z80.0 FAMILY HISTORY OF COLON CANCER: ICD-10-CM

## 2025-08-26 LAB — GLUCOSE BLDC GLUCOMTR-MCNC: 121 MG/DL (ref 65–99)

## 2025-08-26 PROCEDURE — 25010000002 PROPOFOL 10 MG/ML EMULSION: Performed by: NURSE ANESTHETIST, CERTIFIED REGISTERED

## 2025-08-26 PROCEDURE — 25810000003 LACTATED RINGERS PER 1000 ML: Performed by: INTERNAL MEDICINE

## 2025-08-26 PROCEDURE — 25010000002 LIDOCAINE 2% SOLUTION: Performed by: NURSE ANESTHETIST, CERTIFIED REGISTERED

## 2025-08-26 PROCEDURE — 25010000002 GLYCOPYRROLATE 0.2 MG/ML SOLUTION: Performed by: NURSE ANESTHETIST, CERTIFIED REGISTERED

## 2025-08-26 PROCEDURE — S0260 H&P FOR SURGERY: HCPCS | Performed by: INTERNAL MEDICINE

## 2025-08-26 PROCEDURE — 82948 REAGENT STRIP/BLOOD GLUCOSE: CPT

## 2025-08-26 RX ORDER — PROPOFOL 10 MG/ML
VIAL (ML) INTRAVENOUS AS NEEDED
Status: DISCONTINUED | OUTPATIENT
Start: 2025-08-26 | End: 2025-08-26 | Stop reason: SURG

## 2025-08-26 RX ORDER — SODIUM CHLORIDE, SODIUM LACTATE, POTASSIUM CHLORIDE, CALCIUM CHLORIDE 600; 310; 30; 20 MG/100ML; MG/100ML; MG/100ML; MG/100ML
30 INJECTION, SOLUTION INTRAVENOUS CONTINUOUS PRN
Status: DISCONTINUED | OUTPATIENT
Start: 2025-08-26 | End: 2025-08-26 | Stop reason: HOSPADM

## 2025-08-26 RX ORDER — GLYCOPYRROLATE 0.2 MG/ML
INJECTION INTRAMUSCULAR; INTRAVENOUS AS NEEDED
Status: DISCONTINUED | OUTPATIENT
Start: 2025-08-26 | End: 2025-08-26 | Stop reason: SURG

## 2025-08-26 RX ORDER — LIDOCAINE HYDROCHLORIDE 20 MG/ML
INJECTION, SOLUTION INFILTRATION; PERINEURAL AS NEEDED
Status: DISCONTINUED | OUTPATIENT
Start: 2025-08-26 | End: 2025-08-26 | Stop reason: SURG

## 2025-08-26 RX ADMIN — LIDOCAINE HYDROCHLORIDE 100 MG: 20 INJECTION, SOLUTION INFILTRATION; PERINEURAL at 12:48

## 2025-08-26 RX ADMIN — PROPOFOL 30 MG: 10 INJECTION, EMULSION INTRAVENOUS at 12:50

## 2025-08-26 RX ADMIN — PROPOFOL 200 MCG/KG/MIN: 10 INJECTION, EMULSION INTRAVENOUS at 12:48

## 2025-08-26 RX ADMIN — PROPOFOL 140 MG: 10 INJECTION, EMULSION INTRAVENOUS at 12:48

## 2025-08-26 RX ADMIN — SODIUM CHLORIDE, POTASSIUM CHLORIDE, SODIUM LACTATE AND CALCIUM CHLORIDE 30 ML/HR: 600; 310; 30; 20 INJECTION, SOLUTION INTRAVENOUS at 11:53

## 2025-08-26 RX ADMIN — GLYCOPYRROLATE 0.2 MG: 0.2 INJECTION INTRAMUSCULAR; INTRAVENOUS at 12:45

## (undated) DEVICE — KT ORCA ORCAPOD DISP STRL

## (undated) DEVICE — Device

## (undated) DEVICE — LN SMPL CO2 SHTRM SD STREAM W/M LUER

## (undated) DEVICE — SENSR O2 OXIMAX FNGR A/ 18IN NONSTR

## (undated) DEVICE — TUBING, SUCTION, 1/4" X 10', STRAIGHT: Brand: MEDLINE

## (undated) DEVICE — ADAPT CLN BIOGUARD AIR/H2O DISP